# Patient Record
Sex: FEMALE | Race: BLACK OR AFRICAN AMERICAN | NOT HISPANIC OR LATINO | URBAN - METROPOLITAN AREA
[De-identification: names, ages, dates, MRNs, and addresses within clinical notes are randomized per-mention and may not be internally consistent; named-entity substitution may affect disease eponyms.]

---

## 2020-06-24 ENCOUNTER — LAB OUTSIDE AN ENCOUNTER (OUTPATIENT)
Dept: URBAN - METROPOLITAN AREA CLINIC 40 | Facility: CLINIC | Age: 70
End: 2020-06-24

## 2020-06-24 ENCOUNTER — OFFICE VISIT (OUTPATIENT)
Dept: URBAN - METROPOLITAN AREA CLINIC 40 | Facility: CLINIC | Age: 70
End: 2020-06-24
Payer: MEDICARE

## 2020-06-24 DIAGNOSIS — R93.5 ABNORMAL CT OF THE ABDOMEN: ICD-10-CM

## 2020-06-24 DIAGNOSIS — R10.13 DYSPEPSIA: ICD-10-CM

## 2020-06-24 DIAGNOSIS — Z80.0 FAMILY HISTORY OF CANCER OF DIGESTIVE ORGAN: ICD-10-CM

## 2020-06-24 PROCEDURE — 3017F COLORECTAL CA SCREEN DOC REV: CPT | Performed by: INTERNAL MEDICINE

## 2020-06-24 PROCEDURE — G8427 DOCREV CUR MEDS BY ELIG CLIN: HCPCS | Performed by: INTERNAL MEDICINE

## 2020-06-24 PROCEDURE — G9903 PT SCRN TBCO ID AS NON USER: HCPCS | Performed by: INTERNAL MEDICINE

## 2020-06-24 PROCEDURE — 99203 OFFICE O/P NEW LOW 30 MIN: CPT | Performed by: INTERNAL MEDICINE

## 2020-06-24 PROCEDURE — G8420 CALC BMI NORM PARAMETERS: HCPCS | Performed by: INTERNAL MEDICINE

## 2020-06-24 RX ORDER — MULTIVIT WITH MINERALS/LUTEIN
AS DIRECTED TABLET ORAL ONCE A DAY
Status: ACTIVE | COMMUNITY

## 2020-06-24 RX ORDER — MELATONIN 3 MG
1 CAPSULE TABLET ORAL ONCE A DAY
Status: ACTIVE | COMMUNITY

## 2020-06-24 RX ORDER — PANTOPRAZOLE SODIUM 40 MG/1
1 TABLET TABLET, DELAYED RELEASE ORAL ONCE A DAY
Qty: 90 | Refills: 0 | OUTPATIENT
Start: 2020-06-24

## 2020-06-24 RX ORDER — LORATADINE 10 MG
1 TABLET TABLET ORAL ONCE A DAY
Status: ACTIVE | COMMUNITY

## 2020-06-24 NOTE — HPI-TODAY'S VISIT:
Ms. Cabrera presents for evaluation of duodenitis.  Patient was in her usual state of health until last month.  She had acute onset of epigastric discomfort.  She noticed some fullness that worsened overnight.  She states that the pressure felt like if she could just belch it would relieve.  She tried Tums but the pain got worse.  She presented to the emergency room for a CT scan of the abdomen and pelvis did show circumferential wall thickening of the second portion of the duodenum likely representing duodenitis.  There was no evidence of obstruction.  CT also showed multiple low-density lesions within the liver.  The largest lesion was 1.1 x 1.3 cm.  Radiology indicates these are either cysts or hemangiomas.  They recommended an MRI for further evaluation of this.  Gallbladder and pancreas was unremarkable.  Colon showed diverticulosis.  Her distal esophagus stomach demonstrated no abnormality on the scan.  The lab work at the emergency room showed normal CBC save for an MCV that was 74.3.  Liver tests and lipase were within normal limits.  Patient was discharged on pantoprazole which she states she only took for couple of days.  She has not had that dyspeptic feeling but still notes indigestion.  She denies any NSAIDs prior to the episode or currently.  She denies any xander heartburn or dysphasia.  She does have a family history of stomach cancer in her mother.  Recall she had a colonoscopy for screening purposes in 2019 which was significant for sigmoid diverticulosis as well as internal and external hemorrhoids.

## 2020-06-25 ENCOUNTER — CLAIMS CREATED FROM THE CLAIM WINDOW (OUTPATIENT)
Dept: URBAN - METROPOLITAN AREA CLINIC 4 | Facility: CLINIC | Age: 70
End: 2020-06-25
Payer: MEDICARE

## 2020-06-25 ENCOUNTER — OFFICE VISIT (OUTPATIENT)
Dept: URBAN - METROPOLITAN AREA SURGERY CENTER 30 | Facility: SURGERY CENTER | Age: 70
End: 2020-06-25
Payer: MEDICARE

## 2020-06-25 DIAGNOSIS — K31.89 ACQUIRED DEFORMITY OF PYLORUS: ICD-10-CM

## 2020-06-25 DIAGNOSIS — K22.8 COLUMNAR-LINED ESOPHAGUS: ICD-10-CM

## 2020-06-25 DIAGNOSIS — B96.81 HELICOBACTER PYLORI [H. PYLORI] AS THE CAUSE OF DISEASES CLASSIFIED ELSEWHERE: ICD-10-CM

## 2020-06-25 DIAGNOSIS — R12 HEARTBURN: ICD-10-CM

## 2020-06-25 DIAGNOSIS — R10.9 UNSPECIFIED ABDOMINAL PAIN: ICD-10-CM

## 2020-06-25 DIAGNOSIS — R13.12 DYSPHAGIA, OROPHARYNGEAL PHASE: ICD-10-CM

## 2020-06-25 DIAGNOSIS — B96.81 BACTERIAL INFECTION DUE TO H. PYLORI: ICD-10-CM

## 2020-06-25 DIAGNOSIS — R11.2 NAUSEA WITH VOMITING, UNSPECIFIED: ICD-10-CM

## 2020-06-25 DIAGNOSIS — D50.9 IRON DEFICIENCY ANEMIA, UNSPECIFIED: ICD-10-CM

## 2020-06-25 DIAGNOSIS — K21.9 GASTRO-ESOPHAGEAL REFLUX DISEASE WITHOUT ESOPHAGITIS: ICD-10-CM

## 2020-06-25 DIAGNOSIS — K29.40 ATROPHIC GASTRITIS: ICD-10-CM

## 2020-06-25 DIAGNOSIS — K29.40 CHRONIC ATROPHIC GASTRITIS WITHOUT BLEEDING: ICD-10-CM

## 2020-06-25 PROCEDURE — G8907 PT DOC NO EVENTS ON DISCHARG: HCPCS | Performed by: INTERNAL MEDICINE

## 2020-06-25 PROCEDURE — 88305 TISSUE EXAM BY PATHOLOGIST: CPT | Performed by: PATHOLOGY

## 2020-06-25 PROCEDURE — 88342 IMHCHEM/IMCYTCHM 1ST ANTB: CPT | Performed by: PATHOLOGY

## 2020-06-25 PROCEDURE — 43239 EGD BIOPSY SINGLE/MULTIPLE: CPT | Performed by: INTERNAL MEDICINE

## 2020-06-25 RX ORDER — PANTOPRAZOLE SODIUM 40 MG/1
1 TABLET TABLET, DELAYED RELEASE ORAL ONCE A DAY
Qty: 90 | Refills: 0 | Status: ACTIVE | COMMUNITY
Start: 2020-06-24

## 2020-06-25 RX ORDER — MULTIVIT WITH MINERALS/LUTEIN
AS DIRECTED TABLET ORAL ONCE A DAY
Status: ACTIVE | COMMUNITY

## 2020-06-25 RX ORDER — LORATADINE 10 MG
1 TABLET TABLET ORAL ONCE A DAY
Status: ACTIVE | COMMUNITY

## 2020-06-25 RX ORDER — MELATONIN 3 MG
1 CAPSULE TABLET ORAL ONCE A DAY
Status: ACTIVE | COMMUNITY

## 2020-07-06 ENCOUNTER — OFFICE VISIT (OUTPATIENT)
Dept: URBAN - METROPOLITAN AREA CLINIC 40 | Facility: CLINIC | Age: 70
End: 2020-07-06
Payer: MEDICARE

## 2020-07-06 DIAGNOSIS — K76.89 LIVER CYST: ICD-10-CM

## 2020-07-06 DIAGNOSIS — D18.03 LIVER HEMANGIOMA: ICD-10-CM

## 2020-07-06 DIAGNOSIS — K29.80 DUODENITIS: ICD-10-CM

## 2020-07-06 DIAGNOSIS — K29.70 HELICOBACTER POSITIVE GASTRITIS: ICD-10-CM

## 2020-07-06 PROCEDURE — 3017F COLORECTAL CA SCREEN DOC REV: CPT | Performed by: INTERNAL MEDICINE

## 2020-07-06 PROCEDURE — G8420 CALC BMI NORM PARAMETERS: HCPCS | Performed by: INTERNAL MEDICINE

## 2020-07-06 PROCEDURE — G8427 DOCREV CUR MEDS BY ELIG CLIN: HCPCS | Performed by: INTERNAL MEDICINE

## 2020-07-06 PROCEDURE — G9903 PT SCRN TBCO ID AS NON USER: HCPCS | Performed by: INTERNAL MEDICINE

## 2020-07-06 PROCEDURE — 99213 OFFICE O/P EST LOW 20 MIN: CPT | Performed by: INTERNAL MEDICINE

## 2020-07-06 RX ORDER — MELATONIN 3 MG
1 CAPSULE TABLET ORAL ONCE A DAY
Status: ACTIVE | COMMUNITY

## 2020-07-06 RX ORDER — PANTOPRAZOLE SODIUM 40 MG/1
1 TABLET TABLET, DELAYED RELEASE ORAL ONCE A DAY
OUTPATIENT
Start: 2020-06-24

## 2020-07-06 RX ORDER — AMOXICILLIN 500 MG/1
2 CAPSULES CAPSULE ORAL
Qty: 56 CAPSULE | Refills: 0 | OUTPATIENT
Start: 2020-07-06 | End: 2020-07-20

## 2020-07-06 RX ORDER — MULTIVIT WITH MINERALS/LUTEIN
AS DIRECTED TABLET ORAL ONCE A DAY
Status: ACTIVE | COMMUNITY

## 2020-07-06 RX ORDER — CLARITHROMYCIN 500 MG/1
1 TABLET TABLET, FILM COATED ORAL
Qty: 28 TABLET | Refills: 0 | OUTPATIENT
Start: 2020-07-06 | End: 2020-07-20

## 2020-07-06 RX ORDER — PANTOPRAZOLE SODIUM 40 MG/1
1 TABLET TABLET, DELAYED RELEASE ORAL ONCE A DAY
Qty: 90 | Refills: 0 | Status: ACTIVE | COMMUNITY
Start: 2020-06-24

## 2020-07-06 RX ORDER — LORATADINE 10 MG
1 TABLET TABLET ORAL ONCE A DAY
Status: ACTIVE | COMMUNITY

## 2020-07-06 NOTE — HPI-TODAY'S VISIT:
Ms. Cabrera presents for follow-up from her upper endoscopy that was performed due to CT showing duodenitis.  EGD was performed on June 25.  Gastroduodenitis was found.  Biopsies were positive for H. pylori.  CT scan also showed some liver lesions.  MRI was recommended so this was performed on June 30.  The MRI showed complex septated cyst as well as atypical hemangiomas.  MRI in 6 months time was recommended to assess stability.  There were no other suspicious lesions in the liver.  Gallbladder and pancreas were found to be normal.

## 2020-08-18 ENCOUNTER — OFFICE VISIT (OUTPATIENT)
Dept: URBAN - METROPOLITAN AREA CLINIC 39 | Facility: CLINIC | Age: 70
End: 2020-08-18
Payer: MEDICARE

## 2020-08-18 ENCOUNTER — DASHBOARD ENCOUNTERS (OUTPATIENT)
Age: 70
End: 2020-08-18

## 2020-08-18 ENCOUNTER — OFFICE VISIT (OUTPATIENT)
Dept: URBAN - METROPOLITAN AREA CLINIC 40 | Facility: CLINIC | Age: 70
End: 2020-08-18
Payer: MEDICARE

## 2020-08-18 DIAGNOSIS — K29.70 HELICOBACTER POSITIVE GASTRITIS: ICD-10-CM

## 2020-08-18 DIAGNOSIS — A04.8 H. PYLORI INFECTION: ICD-10-CM

## 2020-08-18 DIAGNOSIS — R11.0 NAUSEA: ICD-10-CM

## 2020-08-18 PROCEDURE — 99214 OFFICE O/P EST MOD 30 MIN: CPT | Performed by: INTERNAL MEDICINE

## 2020-08-18 PROCEDURE — G8420 CALC BMI NORM PARAMETERS: HCPCS | Performed by: INTERNAL MEDICINE

## 2020-08-18 PROCEDURE — 83014 H PYLORI DRUG ADMIN: CPT | Performed by: INTERNAL MEDICINE

## 2020-08-18 PROCEDURE — 3017F COLORECTAL CA SCREEN DOC REV: CPT | Performed by: INTERNAL MEDICINE

## 2020-08-18 PROCEDURE — G9903 PT SCRN TBCO ID AS NON USER: HCPCS | Performed by: INTERNAL MEDICINE

## 2020-08-18 PROCEDURE — G8427 DOCREV CUR MEDS BY ELIG CLIN: HCPCS | Performed by: INTERNAL MEDICINE

## 2020-08-18 RX ORDER — MELATONIN 3 MG
1 CAPSULE TABLET ORAL ONCE A DAY
Status: ACTIVE | COMMUNITY

## 2020-08-18 RX ORDER — LORATADINE 10 MG
1 TABLET TABLET ORAL ONCE A DAY
Status: ACTIVE | COMMUNITY

## 2020-08-18 RX ORDER — MULTIVIT WITH MINERALS/LUTEIN
AS DIRECTED TABLET ORAL ONCE A DAY
Status: ACTIVE | COMMUNITY

## 2020-08-18 RX ORDER — PANTOPRAZOLE SODIUM 40 MG/1
1 TABLET TABLET, DELAYED RELEASE ORAL ONCE A DAY
Qty: 90 | Status: ACTIVE | COMMUNITY
Start: 2020-06-24

## 2020-08-18 RX ORDER — PANTOPRAZOLE SODIUM 40 MG/1
1 TABLET TABLET, DELAYED RELEASE ORAL ONCE A DAY
Qty: 90
Start: 2020-06-24

## 2020-08-18 RX ORDER — PANTOPRAZOLE SODIUM 40 MG/1
1 TABLET TABLET, DELAYED RELEASE ORAL ONCE A DAY
Status: ACTIVE | COMMUNITY
Start: 2020-06-24

## 2020-08-18 NOTE — HPI-TODAY'S VISIT:
Ms. Cabrera presents to clinic for follow-up.  Recall she was seen last on July 6.  This was after she had an EGD that identified H. pylori positive gastritis.  She was started on triple therapy.  We also done work-up for liver lesion on a CT scan with an MRI.  This showed septated cyst as well as atypical hemangiomas.  MRI in 6 months was recommended.  Patient completed antibiotics over 2 weeks ago.  She is feeling better in terms of her dyspeptic symptoms.  She is has had some nausea of late.  She denies any change in bowel habits.  She denies any xander abdominal pain

## 2021-05-17 ENCOUNTER — TELEPHONE ENCOUNTER (OUTPATIENT)
Dept: URBAN - METROPOLITAN AREA CLINIC 40 | Facility: CLINIC | Age: 71
End: 2021-05-17

## 2025-03-04 ENCOUNTER — LAB OUTSIDE AN ENCOUNTER (OUTPATIENT)
Dept: URBAN - METROPOLITAN AREA SURGERY CENTER 30 | Facility: SURGERY CENTER | Age: 75
End: 2025-03-04

## 2025-03-10 ENCOUNTER — CLAIMS CREATED FROM THE CLAIM WINDOW (OUTPATIENT)
Dept: URBAN - METROPOLITAN AREA SURGERY CENTER 30 | Facility: SURGERY CENTER | Age: 75
End: 2025-03-10
Payer: MEDICARE

## 2025-03-10 ENCOUNTER — CLAIMS CREATED FROM THE CLAIM WINDOW (OUTPATIENT)
Dept: URBAN - METROPOLITAN AREA CLINIC 4 | Facility: CLINIC | Age: 75
End: 2025-03-10
Payer: MEDICARE

## 2025-03-10 DIAGNOSIS — Z86.0101 H/O ADENOMATOUS POLYP OF COLON: ICD-10-CM

## 2025-03-10 DIAGNOSIS — D12.5 ADENOMA OF SIGMOID COLON: ICD-10-CM

## 2025-03-10 DIAGNOSIS — K63.5 POLYP OF COLON: ICD-10-CM

## 2025-03-10 DIAGNOSIS — Z09 ENCNTR FOR F/U EXAM AFT TRTMT FOR COND OTH THAN MALIG NEOPLM: ICD-10-CM

## 2025-03-10 DIAGNOSIS — Z86.0100 PERSONAL HISTORY OF COLONIC POLYPS: ICD-10-CM

## 2025-03-10 DIAGNOSIS — K63.5 BENIGN COLON POLYP: ICD-10-CM

## 2025-03-10 DIAGNOSIS — D12.5 BENIGN NEOPLASM OF SIGMOID COLON: ICD-10-CM

## 2025-03-10 DIAGNOSIS — K63.5 HYPERPLASTIC COLON POLYP: ICD-10-CM

## 2025-03-10 PROCEDURE — 45385 COLONOSCOPY W/LESION REMOVAL: CPT | Performed by: INTERNAL MEDICINE

## 2025-03-10 PROCEDURE — 45385 COLONOSCOPY W/LESION REMOVAL: CPT | Performed by: CLINIC/CENTER

## 2025-03-10 PROCEDURE — 88305 TISSUE EXAM BY PATHOLOGIST: CPT | Performed by: PATHOLOGY

## 2025-03-10 PROCEDURE — 00811 ANES LWR INTST NDSC NOS: CPT | Performed by: NURSE ANESTHETIST, CERTIFIED REGISTERED

## 2025-03-10 RX ORDER — LORATADINE 10 MG
1 TABLET TABLET ORAL ONCE A DAY
Status: ACTIVE | COMMUNITY

## 2025-03-10 RX ORDER — MULTIVIT WITH MINERALS/LUTEIN
AS DIRECTED TABLET ORAL ONCE A DAY
Status: ACTIVE | COMMUNITY

## 2025-03-10 RX ORDER — PANTOPRAZOLE SODIUM 40 MG/1
1 TABLET TABLET, DELAYED RELEASE ORAL ONCE A DAY
Qty: 90 | Status: ACTIVE | COMMUNITY
Start: 2020-06-24

## 2025-03-10 RX ORDER — MELATONIN 3 MG
1 CAPSULE TABLET ORAL ONCE A DAY
Status: ACTIVE | COMMUNITY